# Patient Record
Sex: FEMALE | Race: WHITE | URBAN - METROPOLITAN AREA
[De-identification: names, ages, dates, MRNs, and addresses within clinical notes are randomized per-mention and may not be internally consistent; named-entity substitution may affect disease eponyms.]

---

## 2024-04-01 ENCOUNTER — TELEPHONE (OUTPATIENT)
Dept: ENDOCRINOLOGY | Age: 44
End: 2024-04-01

## 2024-04-01 LAB
T3 FREE: 2.4 PG/ML (ref 2.5–3.9)
T4 FREE: 0.49 NG/DL (ref 0.61–1.12)
TSH SERPL DL<=0.05 MIU/L-ACNC: 0.57 UIU/ML (ref 0.45–5.33)

## 2024-04-01 NOTE — TELEPHONE ENCOUNTER
----- Message from Juanita Burgess MD sent at 4/1/2024 11:23 AM EDT -----  Please inform lab she is not my patient. Please inform patient and the pcp.

## 2024-04-01 NOTE — TELEPHONE ENCOUNTER
Shahbaz with [facility] lab informed that lab results received need to go to ordering provider as he is not provider for this pt.

## 2024-04-02 ENCOUNTER — TELEPHONE (OUTPATIENT)
Dept: ENDOCRINOLOGY | Age: 44
End: 2024-04-02

## 2024-04-02 LAB — T3 TOTAL: 69 NG/DL (ref 71–180)

## 2024-04-02 NOTE — TELEPHONE ENCOUNTER
LMOM to contact the office.  Our office has been receiving lab results from an outside lab.  Please contact the lab and be sure they have the correct ordering provider noted.

## 2024-04-03 NOTE — TELEPHONE ENCOUNTER
[facility] out pt lab is currently closed they open at 7 am tomorrow.  Will call back.  Also left another message for Mrs. Diegoeulaliokaylee to contact the office.